# Patient Record
Sex: FEMALE | URBAN - METROPOLITAN AREA
[De-identification: names, ages, dates, MRNs, and addresses within clinical notes are randomized per-mention and may not be internally consistent; named-entity substitution may affect disease eponyms.]

---

## 2018-06-07 ENCOUNTER — RECORDS - HEALTHEAST (OUTPATIENT)
Dept: LAB | Facility: CLINIC | Age: 28
End: 2018-06-07

## 2018-06-08 LAB — N GONORRHOEA DNA SPEC QL NAA+PROBE: NEGATIVE

## 2018-06-11 LAB
QTF INTERPRETATION: NORMAL
QTF MITOGEN - NIL: 9.22 IU/ML
QTF NIL: 0.09 IU/ML
QTF RESULT: NEGATIVE
QTF TB ANTIGEN - NIL: 0.02 IU/ML

## 2019-06-03 ENCOUNTER — HOSPITAL ENCOUNTER (EMERGENCY)
Facility: CLINIC | Age: 29
Discharge: HOME | End: 2019-06-03
Attending: EMERGENCY MEDICINE | Admitting: EMERGENCY MEDICINE
Payer: COMMERCIAL

## 2019-06-03 ENCOUNTER — APPOINTMENT (OUTPATIENT)
Dept: CT IMAGING | Facility: CLINIC | Age: 29
End: 2019-06-03
Attending: EMERGENCY MEDICINE
Payer: COMMERCIAL

## 2019-06-03 VITALS
TEMPERATURE: 98.3 F | WEIGHT: 141.09 LBS | OXYGEN SATURATION: 98 % | RESPIRATION RATE: 16 BRPM | HEART RATE: 68 BPM | SYSTOLIC BLOOD PRESSURE: 130 MMHG | DIASTOLIC BLOOD PRESSURE: 84 MMHG

## 2019-06-03 DIAGNOSIS — S06.0X0A CONCUSSION WITHOUT LOSS OF CONSCIOUSNESS, INITIAL ENCOUNTER: ICD-10-CM

## 2019-06-03 DIAGNOSIS — S00.03XA CONTUSION OF SCALP, INITIAL ENCOUNTER: ICD-10-CM

## 2019-06-03 PROCEDURE — 99284 EMERGENCY DEPT VISIT MOD MDM: CPT | Mod: 25

## 2019-06-03 PROCEDURE — 70450 CT HEAD/BRAIN W/O DYE: CPT

## 2019-06-03 PROCEDURE — 25000132 ZZH RX MED GY IP 250 OP 250 PS 637: Performed by: EMERGENCY MEDICINE

## 2019-06-03 RX ORDER — ACETAMINOPHEN 325 MG/1
650 TABLET ORAL ONCE
Qty: 2 TABLET | Refills: 0 | Status: SHIPPED | OUTPATIENT
Start: 2019-06-03 | End: 2019-06-03

## 2019-06-03 RX ORDER — ACETAMINOPHEN 325 MG/1
650 TABLET ORAL ONCE
Status: COMPLETED | OUTPATIENT
Start: 2019-06-03 | End: 2019-06-03

## 2019-06-03 RX ADMIN — ACETAMINOPHEN 650 MG: 325 TABLET, FILM COATED ORAL at 17:39

## 2019-06-03 ASSESSMENT — ENCOUNTER SYMPTOMS
HEADACHES: 1
NAUSEA: 1
DIZZINESS: 1
PHOTOPHOBIA: 1
VOMITING: 0
WOUND: 1

## 2019-06-03 NOTE — ED TRIAGE NOTES
Arrives with head injury after being kneed in the head during jiu jitsu. Has lump on right forehead and eyes are black today, also has headache. Alert and oriented, ABCs intact.

## 2019-06-03 NOTE — ED AVS SNAPSHOT
[facility] Emergency Department  [address]  [facility] [address]  Phone:  [phone]  Fax:  [phone]                                    Cheyenne Pope   MRN: [id number]    Department:  [facility] Emergency Department   Date of Visit:  6/3/2019           After Visit Summary Signature Page    I have received my discharge instructions, and my questions have been answered. I have discussed any challenges I see with this plan with the nurse or doctor.    ..........................................................................................................................................  Patient/Patient Representative Signature      ..........................................................................................................................................  Patient Representative Print Name and Relationship to Patient    ..................................................               ................................................  Date                                   Time    ..........................................................................................................................................  Reviewed by Signature/Title    ...................................................              ..............................................  Date                                               Time          22EPIC Rev 08/18

## 2019-06-03 NOTE — ED PROVIDER NOTES
History     Chief Complaint:  Head Injury    HPI   Cheyenne Pope is a 29 year old female who presents to the emergency department today for evaluation following a head injury. The patient reports that she was training for [employer] on 5/1/19 and was laying flat when she took a knee to the right side of her forehead. Since then she has been experiencing dizziness, nausea, and a headache with associated photophobia, as well as significant bruising to the area. This prompted her to call the nurse line who recommended ED presentation. Here the patient denies any loss of consciousness, emesis, or use of tylenol or ibuprofen.    Allergies:  Penicillins     Medications:    Prozac  Denies tylenol, ibuprofen use.     Past Medical History:    Medical history reviewed. No pertinent medical history.    Past Surgical History:    Surgical history reviewed. No pertinent surgical history.    Family History:    Family history reviewed. No pertinent family history.     Social History:  The patient was accompanied to the ED by her friends.  Patient does [employer].     Review of Systems   Eyes: Positive for photophobia.   Gastrointestinal: Positive for nausea. Negative for vomiting.   Skin: Positive for wound.   Neurological: Positive for dizziness and headaches.        No loss of consciousness   All other systems reviewed and are negative.      Physical Exam     Patient Vitals for the past 24 hrs:   BP Temp Temp src Pulse Resp SpO2 Weight   06/03/19 1929 130/84 -- -- -- 16 98 % --   06/03/19 1840 -- -- -- -- -- 96 % --   06/03/19 1800 -- -- -- -- -- 98 % --   06/03/19 1710 (!) 134/97 98.3  F (36.8  C) Oral 68 20 100 % 64 kg (141 lb 1.5 oz)     Physical Exam  General: Alert. Appears comfortable  Head:  The R. Forehead is with 4cm older appearing contusion; no mastoid tenderness. Mild periorbital R. ecchymosis  Eyes:  Sclera white; Pupils are equal and round. EOMI  ENT:    External ears normal.  No hemotympanum.      External  nares normal.  No septal hematoma.    Neck:  No midline tenderness or pain with full ROM.  CV:  Rate as above with regular rhythm   No murmur   2/2 radial and dorsal pedal pulses  Resp:  Breath sounds clear and equal bilaterally    Non-labored, no retractions or accessory muscle use  GI:  Abdomen soft, non-tender, non-distended    No rebound tenderness or guarding  MSK:  No midline tenderness or bony step-off    No deformity    Moves all extremities equally and symmetrically  Skin:  No rash or lesions noted.  Neuro:   No apparent deficit.    Strength 5/5 x4.  Sensation intact x4. Finger to nose intact. Gait stable.      Cranial nerves intact by examination.    GCS: 15  Psych:  Normal affect.      Emergency Department Course     Imaging:  Radiology findings were communicated with the patient who voiced understanding of the findings.    Head CT w/o contrast  1. Focal right frontal scalp hematoma without underlying fracture.  2. No evidence of acute intracranial hemorrhage, mass, or herniation.  Reading per radiology    Interventions:  1739 Tylenol 650 mg Oral    Emergency Department Course:    1750 Nursing notes and vitals reviewed.    1755 I performed an exam of the patient as documented above.     1843 The patient was sent for a head CT while in the emergency department, results above.     1912 Patient rechecked and updated.     1932 I personally reviewed the imaging results with the patient and answered all related questions prior to discharge.    Impression & Plan      Medical Decision Making:  Cheyenne Pope is a 29 year old female who presents to the emergency department today for evaluation of   Head injury.  Patient is neurologically intact on arrival.  She does have significant ecchymosis to her head.  I did discuss with patient she likely sustained a concussion though she is requesting formal head CT at this time.  This was fortunately without evidence of intracranial hemorrhage, CVA or acute process.  "The patient/family understand that they must return if any \"red flags\" appear/develop in the coming hours/days, as this may represent an indication to perform a CT scan.  I have noted that \"red flags\" include: headaches that get worse, increased drowsiness, strange behavior, repetitive speech, seizures, repeated vomiting, growing confusion, increased irritability, slurred speech, weakness or numbness, and loss of responsiveness.  This information will also be provided in writing at discharge.  I have discussed the second impact syndrome, and the importance of not sustaining repeated concussion in the next 1-2 weeks.  Post concussive syndrome was also discussed.  Tylenol/ibuprofen for pain control.     Diagnosis:    ICD-10-CM    1. Contusion of scalp, initial encounter S00.03XA    2. Concussion without loss of consciousness, initial encounter S06.0X0A      Disposition:   Discharged to home    Discharge Medications:     Review of your medicines      START taking      Dose / Directions   acetaminophen 325 MG tablet  Commonly known as:  TYLENOL      Dose:  650 mg  Take 2 tablets (650 mg) by mouth once for 1 dose  Quantity:  2 tablet  Refills:  0           Where to get your medicines      Some of these will need a paper prescription and others can be bought over the counter. Ask your nurse if you have questions.    Bring a paper prescription for each of these medications    acetaminophen 325 MG tablet       Scribe Disclosure:  I, Mabel Roberto, am serving as a scribe at 5:29 PM on 6/3/2019 to document services personally performed by Tatyana Laird DO based on my observations and the provider's statements to me.    [facility] EMERGENCY DEPARTMENT         Tatyana Laird DO  06/03/19 1940    "

## 2020-02-05 LAB
HBV SURFACE AG SERPL QL IA: NEGATIVE
HIV 1+2 AB+HIV1 P24 AG SERPL QL IA: NEGATIVE
RUBELLA ANTIBODY IGG QUANTITATIVE: NORMAL IU/ML

## 2020-03-11 ENCOUNTER — HEALTH MAINTENANCE LETTER (OUTPATIENT)
Age: 30
End: 2020-03-11

## 2020-07-15 ENCOUNTER — HOSPITAL ENCOUNTER (OUTPATIENT)
Facility: CLINIC | Age: 30
End: 2020-07-15
Admitting: OBSTETRICS & GYNECOLOGY
Payer: COMMERCIAL

## 2020-07-15 ENCOUNTER — HOSPITAL ENCOUNTER (OUTPATIENT)
Facility: CLINIC | Age: 30
Discharge: HOME | End: 2020-07-15
Attending: OBSTETRICS & GYNECOLOGY | Admitting: OBSTETRICS & GYNECOLOGY
Payer: COMMERCIAL

## 2020-07-15 VITALS — TEMPERATURE: 99.2 F | SYSTOLIC BLOOD PRESSURE: 121 MMHG | DIASTOLIC BLOOD PRESSURE: 74 MMHG | RESPIRATION RATE: 20 BRPM

## 2020-07-15 PROBLEM — Z36.89 ENCOUNTER FOR TRIAGE IN PREGNANT PATIENT: Status: ACTIVE | Noted: 2020-07-15

## 2020-07-15 LAB
ALBUMIN UR-MCNC: NEGATIVE MG/DL
APPEARANCE UR: ABNORMAL
BACTERIA #/AREA URNS HPF: ABNORMAL /HPF
BILIRUB UR QL STRIP: NEGATIVE
COLOR UR AUTO: ABNORMAL
GLUCOSE UR STRIP-MCNC: NEGATIVE MG/DL
HGB UR QL STRIP: NEGATIVE
KETONES UR STRIP-MCNC: NEGATIVE MG/DL
LEUKOCYTE ESTERASE UR QL STRIP: ABNORMAL
MUCOUS THREADS #/AREA URNS LPF: PRESENT /LPF
NITRATE UR QL: NEGATIVE
PH UR STRIP: 6 PH (ref 5–7)
RBC #/AREA URNS AUTO: 8 /HPF (ref 0–2)
SOURCE: ABNORMAL
SP GR UR STRIP: 1.02 (ref 1–1.03)
SQUAMOUS #/AREA URNS AUTO: 19 /HPF (ref 0–1)
TRANS CELLS #/AREA URNS HPF: 1 /HPF (ref 0–1)
UROBILINOGEN UR STRIP-MCNC: NORMAL MG/DL (ref 0–2)
WBC #/AREA URNS AUTO: 24 /HPF (ref 0–5)

## 2020-07-15 PROCEDURE — 81001 URINALYSIS AUTO W/SCOPE: CPT | Performed by: FAMILY MEDICINE

## 2020-07-15 PROCEDURE — 99203 OFFICE O/P NEW LOW 30 MIN: CPT | Performed by: OBSTETRICS & GYNECOLOGY

## 2020-07-15 PROCEDURE — G0463 HOSPITAL OUTPT CLINIC VISIT: HCPCS

## 2020-07-15 PROCEDURE — 87086 URINE CULTURE/COLONY COUNT: CPT | Performed by: FAMILY MEDICINE

## 2020-07-15 RX ORDER — FLUOXETINE 20 MG/1
20 TABLET, FILM COATED ORAL DAILY
COMMUNITY

## 2020-07-15 RX ORDER — VITAMIN A ACETATE, .BETA.-CAROTENE, ASCORBIC ACID, CHOLECALCIFEROL, .ALPHA.-TOCOPHEROL ACETATE, DL-, THIAMINE MONONITRATE, RIBOFLAVIN, NIACINAMIDE, PYRIDOXINE HYDROCHLORIDE, FOLIC ACID, CYANOCOBALAMIN, CALCIUM CARBONATE, FERROUS FUMARATE, ZINC OXIDE, AND CUPRIC OXIDE 2000; 2000; 120; 400; 22; 1.84; 3; 20; 10; 1; 12; 200; 27; 25; 2 [IU]/1; [IU]/1; MG/1; [IU]/1; MG/1; MG/1; MG/1; MG/1; MG/1; MG/1; UG/1; MG/1; MG/1; MG/1; MG/1
1 TABLET ORAL DAILY
COMMUNITY

## 2020-07-15 RX ORDER — ALBUTEROL SULFATE 90 UG/1
2 AEROSOL, METERED RESPIRATORY (INHALATION) EVERY 6 HOURS
COMMUNITY

## 2020-07-15 SDOH — HEALTH STABILITY: MENTAL HEALTH: HOW OFTEN DO YOU HAVE A DRINK CONTAINING ALCOHOL?: NEVER

## 2020-07-15 NOTE — PROVIDER NOTIFICATION
07/15/20 1750   Provider Notification   Provider Name/Title Dr. Baron   Method of Notification At Bedside   Patient now being seen by Dr. Baron for unassigned patients after discussing with Dr. Rivera.   Dr. Baron at bedside for eval. Strip reviewed and assessment done. OK to discharge home.

## 2020-07-15 NOTE — PLAN OF CARE
Data: Patient presented to [facility]: 7/15/2020  4:31 PM.  Reason for maternal/fetal assessment is abdominal pain. Patient reports right sided abdominal pain and right flank pain.  Patient is a .  Prenatal record reviewed. Pregnancy  has been uncomplicated.  Gestational Age Unknown. VSS. Fetal movement present. Patient denies uterine contractions, leaking of vaginal fluid/rupture of membranes, vaginal bleeding, abdominal pain, pelvic pressure, vomiting, headache, visual disturbances, epigastric or URQ pain, significant edema. Support person is present.   Action: Verbal consent for EFM. Triage assessment completed. Bill of rights reviewed.  Response: Patient verbalized agreement with plan. Will contact Dr Lety Rivera with update and further orders.

## 2020-07-15 NOTE — DISCHARGE INSTRUCTIONS
Discharge Instruction for Undelivered Patients      You were seen for: abdominal pain  We Consulted: Dr. Baron  You had (Test or Medicine):urine test      Diet:   Drink 8 to 12 glasses of liquids (milk, juice, water) every day.  You may eat meals and snacks.     Activity:  Call your doctor or nurse midwife if your baby is moving less than usual.     Call your provider if you notice:  Swelling in your face or increased swelling in your hands or legs.  Headaches that are not relieved by Tylenol (acetaminophen).  Changes in your vision (blurring: seeing spots or stars.)  Nausea (sick to your stomach) and vomiting (throwing up).   Weight gain of 5 pounds or more per week.  Heartburn that doesn't go away.  Signs of bladder infection: pain when you urinate (use the toilet), need to go more often and more urgently.  The bag of simmons (rupture of membranes) breaks, or you notice leaking in your underwear.  Bright red blood in your underwear.  Abdominal (lower belly) or stomach pain.  For first baby: Contractions (tightening) less than 5 minutes apart for one hour or more.  Second (plus) baby: Contractions (tightening) less than 10 minutes apart and getting stronger.  *If less than 34 weeks: Contractions (tightenings) more than 6 times in one hour.  Increase or change in vaginal discharge (note the color and amount)      Follow-up:  As scheduled in the clinic

## 2020-07-15 NOTE — H&P
July 15, 2020    Cheyenne Pope  [id number]            OB History & Physical      Ms. Olivia Pope  is here for right side abd pain.    Pt is here as unassigned OB and gets prenatal care at  CN service.    She has noticed some right upper abd pain on right side since this AM.  It bothered her on and off all day, but currently it is improved.  The pain is crampy, mild, and not accompanied by any dysuria, frequency, VB, vaginal discharge, SROM, or UCs.  Fetus is active.  Pain is not worse with greasy food.    Patient's last menstrual period was 2019.   Her Estimated Date of Delivery: Sep 13, 2020, making her 31w3d.      There is no height or weight on file to calculate BMI.  Her prenatal course has been w/ CNM service at [facility] and has been benign.    See prenatal for labs.  Unknown GBS, Rubella Immune, RH Positive         She is a 30 year old   Her OB history:   OB History    Para Term  AB Living   2 1 1 0 0 1   SAB TAB Ectopic Multiple Live Births   0 0 0 0 1      # Outcome Date GA Lbr Paul/2nd Weight Sex Delivery Anes PTL Lv   2 Current            1 Term 2017    F Vag-Spont  N JENNIE      Complications: Preeclampsia/Hypertension            Past Medical History:   Diagnosis Date     Depressive disorder      Uncomplicated asthma         History reviewed. No pertinent surgical history.      No current outpatient medications on file.       Allergies: Penicillins      REVIEW OF SYSTEMS:  NEUROLOGIC:  Negative  EYES:  Negative  ENT:  Negative  GI:  Negative  :  Negative  GYN:  Negative  CV:  Negative  PULMONARY:  Negative  MUSCULOSKELETAL:  Negative  PSYCH:  Negative        Social History     Socioeconomic History     Marital status:      Spouse name: Not on file     Number of children: Not on file     Years of education: Not on file     Highest education level: Not on file   Occupational History     Not on file   Social Needs     Financial resource strain: Not on file     Food  insecurity     Worry: Not on file     Inability: Not on file     Transportation needs     Medical: Not on file     Non-medical: Not on file   Tobacco Use     Smoking status: Never Smoker     Smokeless tobacco: Never Used   Substance and Sexual Activity     Alcohol use: Never     Frequency: Never     Drug use: Never     Sexual activity: Never   Lifestyle     Physical activity     Days per week: Not on file     Minutes per session: Not on file     Stress: Not on file   Relationships     Social connections     Talks on phone: Not on file     Gets together: Not on file     Attends Islam service: Not on file     Active member of club or organization: Not on file     Attends meetings of clubs or organizations: Not on file     Relationship status: Not on file     Intimate partner violence     Fear of current or ex partner: Not on file     Emotionally abused: Not on file     Physically abused: Not on file     Forced sexual activity: Not on file   Other Topics Concern     Not on file   Social History Narrative     Not on file      History reviewed. No pertinent family history.      Exam:    Vitals:   /74   Temp 99.2  F (37.3  C) (Oral)   Resp 20   LMP 12/16/2019   FHT: Reactive, category 1    [place]:  No contractions noted or felt by Pt    Gen- Alert Awake in NAD  HEENT grossly normal  Neck: no lymphadenopathy or thryoidomegaly  Lungs CTAB  Back No CVAT  Heart RR  ABD gravid, NABS, soft, NT on exam with NT fundus palpable. RUQ non-tender in area Pt states had been having pain  Back - no CVAT  Cervix not checked  EXT:  No edema, no calf tenderness    Labs:  Results for orders placed or performed during the hospital encounter of 07/15/20   UA with Microscopic reflex to Culture     Status: Abnormal    Specimen: Urine clean catch; Midstream Urine   Result Value Ref Range    Color Urine Light Yellow     Appearance Urine Slightly Cloudy     Glucose Urine Negative NEG^Negative mg/dL    Bilirubin Urine Negative  NEG^Negative    Ketones Urine Negative NEG^Negative mg/dL    Specific Gravity Urine 1.016 1.003 - 1.035    Blood Urine Negative NEG^Negative    pH Urine 6.0 5.0 - 7.0 pH    Protein Albumin Urine Negative NEG^Negative mg/dL    Urobilinogen mg/dL Normal 0.0 - 2.0 mg/dL    Nitrite Urine Negative NEG^Negative    Leukocyte Esterase Urine Large (A) NEG^Negative    Source Midstream Urine     WBC Urine 24 (H) 0 - 5 /HPF    RBC Urine 8 (H) 0 - 2 /HPF    Bacteria Urine Few (A) NEG^Negative /HPF    Squamous Epithelial /HPF Urine 19 (H) 0 - 1 /HPF    Transitional Epi 1 0 - 1 /HPF    Mucous Urine Present (A) NEG^Negative /LPF       Assessment:    1)  IUP at 31w3d  2)  RUQ pain - appears to have mostly resolved without Rx.  Exam is benign, fetal tracing is Cat 1, and no sx's of labor.  Suspect this is musculoskeletal discomforts of pregnancy, less likely cholelithiasis balwinder since no change with greasy food.      Plan:    1)  Discharge home.  2)  Advised Pt to f/u w/ her [facility] CNM providers as scheduled.  3)  Also advised Pt that ideally she should present to one of the hospitals where the [facility] CNMs deliver for future episodes or sx's, so that continuity can be maintained with that group, since none of them deliver at [facility].  4)  I also advised Pt that if she intends on delivering at [facility] regardless, I would recommend seeing if he insurance will allow SHREE to the  CNM service at [facility] in [place], and if so she can inquire with that group to see if they will accept a late-prenatal transfer.  I did caution Pt that the group may not be able to take her at so late a time in her pregnancy but she can ask.  5)  I spent 30 minutes with patient and staff on hospital floor assessing patient and coordinating care.        Gilbert Baron MD  July 15, 2020

## 2020-07-15 NOTE — PLAN OF CARE
Data: Patient assessed in the [facility] for abdominal pain.  Cervical exam not examined.  Membranes intact.    Action:  Presumed adequate fetal oxygenation documented (see flow record). Discharge instructions reviewed.  Patient instructed to report change in fetal movement, vaginal leaking of fluid or bleeding, abdominal pain, or any concerns related to the pregnancy to her nurse/physician.    Response: Orders to discharge home per Dr. Baron. Patient verbalized understanding of education and verbalized agreement with plan. Discharged to home at 1811.

## 2020-07-16 LAB
BACTERIA SPEC CULT: NORMAL
Lab: NORMAL
SPECIMEN SOURCE: NORMAL

## 2021-01-03 ENCOUNTER — HEALTH MAINTENANCE LETTER (OUTPATIENT)
Age: 31
End: 2021-01-03

## 2021-04-25 ENCOUNTER — HEALTH MAINTENANCE LETTER (OUTPATIENT)
Age: 31
End: 2021-04-25

## 2021-10-10 ENCOUNTER — HEALTH MAINTENANCE LETTER (OUTPATIENT)
Age: 31
End: 2021-10-10

## 2022-05-21 ENCOUNTER — HEALTH MAINTENANCE LETTER (OUTPATIENT)
Age: 32
End: 2022-05-21

## 2022-09-18 ENCOUNTER — HEALTH MAINTENANCE LETTER (OUTPATIENT)
Age: 32
End: 2022-09-18

## 2023-06-04 ENCOUNTER — HEALTH MAINTENANCE LETTER (OUTPATIENT)
Age: 33
End: 2023-06-04